# Patient Record
Sex: FEMALE | Race: WHITE | NOT HISPANIC OR LATINO | Employment: UNEMPLOYED | URBAN - METROPOLITAN AREA
[De-identification: names, ages, dates, MRNs, and addresses within clinical notes are randomized per-mention and may not be internally consistent; named-entity substitution may affect disease eponyms.]

---

## 2017-01-07 ENCOUNTER — HOSPITAL ENCOUNTER (EMERGENCY)
Facility: HOSPITAL | Age: 22
Discharge: HOME | End: 2017-01-07
Attending: EMERGENCY MEDICINE | Admitting: EMERGENCY MEDICINE

## 2017-01-07 VITALS
TEMPERATURE: 97.9 F | WEIGHT: 110 LBS | HEIGHT: 65 IN | RESPIRATION RATE: 20 BRPM | SYSTOLIC BLOOD PRESSURE: 125 MMHG | HEART RATE: 85 BPM | DIASTOLIC BLOOD PRESSURE: 95 MMHG | OXYGEN SATURATION: 99 % | BODY MASS INDEX: 18.33 KG/M2

## 2017-01-07 DIAGNOSIS — L50.9 HIVES: Primary | ICD-10-CM

## 2017-01-07 DIAGNOSIS — T78.40XA ALLERGIC REACTION, INITIAL ENCOUNTER: ICD-10-CM

## 2017-01-07 PROCEDURE — 63710000001 DIPHENHYDRAMINE PER 50 MG: Performed by: EMERGENCY MEDICINE

## 2017-01-07 PROCEDURE — 63710000001 PREDNISONE PER 5 MG: Performed by: EMERGENCY MEDICINE

## 2017-01-07 PROCEDURE — 99283 EMERGENCY DEPT VISIT LOW MDM: CPT

## 2017-01-07 RX ORDER — RANITIDINE 150 MG/1
150 TABLET ORAL 2 TIMES DAILY
Qty: 10 TABLET | Refills: 0 | Status: SHIPPED | OUTPATIENT
Start: 2017-01-07 | End: 2017-01-12

## 2017-01-07 RX ORDER — FAMOTIDINE 20 MG/1
20 TABLET, FILM COATED ORAL ONCE
Status: COMPLETED | OUTPATIENT
Start: 2017-01-07 | End: 2017-01-07

## 2017-01-07 RX ORDER — PREDNISONE 20 MG/1
20 TABLET ORAL 3 TIMES DAILY
Qty: 15 TABLET | Refills: 0 | Status: SHIPPED | OUTPATIENT
Start: 2017-01-07

## 2017-01-07 RX ORDER — AMOXICILLIN 500 MG/1
1000 CAPSULE ORAL 3 TIMES DAILY
COMMUNITY

## 2017-01-07 RX ORDER — EPINEPHRINE 0.3 MG/.3ML
0.3 INJECTION SUBCUTANEOUS ONCE
Qty: 2 EACH | Refills: 0 | Status: SHIPPED | OUTPATIENT
Start: 2017-01-07 | End: 2017-01-07

## 2017-01-07 RX ORDER — DIPHENHYDRAMINE HCL 25 MG
25 CAPSULE ORAL ONCE
Status: COMPLETED | OUTPATIENT
Start: 2017-01-07 | End: 2017-01-07

## 2017-01-07 RX ORDER — DIPHENHYDRAMINE HCL 25 MG
25 TABLET ORAL EVERY 6 HOURS PRN
Qty: 20 TABLET | Refills: 0 | Status: SHIPPED | OUTPATIENT
Start: 2017-01-07 | End: 2017-01-12

## 2017-01-07 RX ORDER — PREDNISONE 20 MG/1
60 TABLET ORAL ONCE
Status: COMPLETED | OUTPATIENT
Start: 2017-01-07 | End: 2017-01-07

## 2017-01-07 RX ADMIN — DIPHENHYDRAMINE HYDROCHLORIDE 25 MG: 25 CAPSULE ORAL at 19:59

## 2017-01-07 RX ADMIN — FAMOTIDINE 20 MG: 20 TABLET ORAL at 19:59

## 2017-01-07 RX ADMIN — PREDNISONE 60 MG: 20 TABLET ORAL at 19:59

## 2017-01-08 NOTE — ED PROVIDER NOTES
Subjective   HPI Comments: 21 yr old female presents to the ED after episodes of allergic reaction yesterday and today. Last night she broke in hives diffusely around 2100. She took a single-dose of benadryl and the hives went away. Today around 1430 she began breaking out in hives again. She took more benadryl and then an hour long nap. When she woke up, she was having trouble breathing with chest tightness. She notes that the hives improved both times after the benadryl.     Pt reports that she is allergic to chocolate in large quantities. She had a small glass of hot chocolate yesterday morning, which she has had before without any problems. The only thing she has consumed both yesterday and today was pizza from [employer]. She had this about half an hour before her sx began both yesterday and today.    Patient is a 21 y.o. female presenting with allergic reaction.   History provided by:  Patient  Allergic Reaction   Presenting symptoms: difficulty breathing, itching and rash (hives diffusely)    Rash:     Location:  Arm, abdomen and chest    Quality: itchiness      Severity:  Moderate    Timing:  Intermittent    Progression:  Partially resolved  Severity:  Moderate  Prior allergic episodes:  Food/nut allergies (chocolate in large quantities)  Relieved by:  Antihistamines      Review of Systems   Constitutional: Negative for chills and fever.   Respiratory: Positive for chest tightness and shortness of breath.    Gastrointestinal: Negative for nausea and vomiting.   Skin: Positive for itching and rash (hives diffusely).   Allergic/Immunologic: Positive for food allergies (large quantities of chocolate).   All other systems reviewed and are negative.      Past Medical History   Diagnosis Date   • Migraine        No Known Allergies    Past Surgical History   Procedure Laterality Date   • Dental procedure         History reviewed. No pertinent family history.    Social History     Social History   • Marital  status: Legally      Spouse name: N/A   • Number of children: N/A   • Years of education: N/A     Social History Main Topics   • Smoking status: Never Smoker   • Smokeless tobacco: None   • Alcohol use Yes      Comment: OCCASIONALLY   • Drug use: No   • Sexual activity: Yes     Birth control/ protection: OCP     Other Topics Concern   • None     Social History Narrative   • None         Objective   Physical Exam   Constitutional: She is oriented to person, place, and time. She appears well-developed and well-nourished.  Non-toxic appearance. No distress.   HENT:   Head: Normocephalic and atraumatic.   Right Ear: External ear normal.   Left Ear: External ear normal.   Nose: Nose normal.   Oropharynx is patent   Eyes: EOM and lids are normal. Pupils are equal, round, and reactive to light.   Neck: Normal range of motion. Neck supple. No tracheal deviation present.   Cardiovascular: Normal rate, regular rhythm and normal heart sounds.  Exam reveals no gallop, no friction rub and no decreased pulses.    No murmur heard.  Pulmonary/Chest: Effort normal and breath sounds normal. No respiratory distress. She has no decreased breath sounds. She has no wheezes. She has no rhonchi. She has no rales.   Abdominal: Soft. Normal appearance and bowel sounds are normal. There is no tenderness.   Musculoskeletal: Normal range of motion. She exhibits no deformity.   Lymphadenopathy:     She has no cervical adenopathy.   Neurological: She is alert and oriented to person, place, and time. She has normal strength. No cranial nerve deficit or sensory deficit.   Skin: Skin is warm and dry. No rash noted. She is not diaphoretic. There is erythema.   Well demarcated irregular erythema, slightly raised and distributed over the RUE and upper back consistent with hives   Psychiatric: She has a normal mood and affect. Her speech is normal and behavior is normal. Judgment and thought content normal. Cognition and memory are normal.  "  Nursing note and vitals reviewed.      Procedures         ED Course  ED Course         Course of Care      Lab Results (last 24 hours)     ** No results found for the last 24 hours. **          Note: In addition to lab results from this visit, the labs listed above may include labs taken at another facility or during a different encounter within the last 24 hours. Please correlate lab times with ED admission and discharge times for further clarification of the services performed during this visit.    No orders to display       Vitals:    01/07/17 1830 01/07/17 1924   BP: 120/80 115/85   BP Location: Left arm    Patient Position: Sitting    Pulse: 113 85   Resp: 18 20   Temp: 97.9 °F (36.6 °C)    TempSrc: Oral    SpO2: 99% 98%   Weight: 110 lb (49.9 kg)    Height: 65\" (165.1 cm)        Medications   predniSONE (DELTASONE) tablet 60 mg (60 mg Oral Given 1/7/17 1959)   famotidine (PEPCID) tablet 20 mg (20 mg Oral Given 1/7/17 1959)   diphenhydrAMINE (BENADRYL) capsule 25 mg (25 mg Oral Given 1/7/17 1959)       ECG/EMG Results (last 24 hours)     ** No results found for the last 24 hours. **                      MDM  Number of Diagnoses or Management Options  Allergic reaction, initial encounter: new and requires workup  Hives: new and requires workup  Diagnosis management comments: Patient appears well upon evaluation, symptoms consistent with Type I hypersensitivity reaction.     Will give prednisone, benadryl, and pepcid.     DC with epipen to be used in case of anaphylaxis.     Followup with PCP.        Amount and/or Complexity of Data Reviewed  Obtain history from someone other than the patient: yes  Review and summarize past medical records: yes  Independent visualization of images, tracings, or specimens: yes    Patient Progress  Patient progress: stable      Final diagnoses:   Hives   Allergic reaction, initial encounter       Documentation assistance provided by lyle Beltran.  Information recorded " by the scribe was done at my direction and has been verified and validated by me.     Ela Beltran  01/07/17 1944       Fazal Hyde MD  01/07/17 2000